# Patient Record
Sex: FEMALE | NOT HISPANIC OR LATINO | Employment: FULL TIME | URBAN - NONMETROPOLITAN AREA
[De-identification: names, ages, dates, MRNs, and addresses within clinical notes are randomized per-mention and may not be internally consistent; named-entity substitution may affect disease eponyms.]

---

## 2022-02-28 ENCOUNTER — TRANSCRIBE ORDERS (OUTPATIENT)
Dept: PHYSICAL THERAPY | Facility: CLINIC | Age: 52
End: 2022-02-28

## 2022-02-28 DIAGNOSIS — Z98.890 S/P LUMBAR DISCECTOMY: Primary | ICD-10-CM

## 2022-02-28 DIAGNOSIS — M51.36 BULGE OF LUMBAR DISC WITHOUT MYELOPATHY: ICD-10-CM

## 2022-03-03 ENCOUNTER — TREATMENT (OUTPATIENT)
Dept: PHYSICAL THERAPY | Facility: CLINIC | Age: 52
End: 2022-03-03

## 2022-03-03 DIAGNOSIS — Z98.890 S/P LUMBAR DISCECTOMY: Primary | ICD-10-CM

## 2022-03-03 DIAGNOSIS — M25.69 DECREASED ROM OF TRUNK AND BACK: ICD-10-CM

## 2022-03-03 DIAGNOSIS — M51.36 BULGE OF LUMBAR DISC WITHOUT MYELOPATHY: ICD-10-CM

## 2022-03-03 DIAGNOSIS — M62.81 WEAKNESS OF TRUNK MUSCULATURE: ICD-10-CM

## 2022-03-03 PROCEDURE — 97110 THERAPEUTIC EXERCISES: CPT | Performed by: PHYSICAL THERAPIST

## 2022-03-03 PROCEDURE — 97162 PT EVAL MOD COMPLEX 30 MIN: CPT | Performed by: PHYSICAL THERAPIST

## 2022-03-03 NOTE — PROGRESS NOTES
Physical Therapy Initial Evaluation and Plan of Care      Patient: Alejandrina Vieyra   : 1970  Diagnosis/ICD-10 Code:  S/P lumbar discectomy [Z98.890]  Referring practitioner: MELLISSA Cason  Date of Initial Visit: 3/3/2022  Today's Date: 3/4/2022  Patient seen for 1 sessions         Visit Diagnoses:    ICD-10-CM ICD-9-CM   1. S/P lumbar discectomy  Z98.890 V45.89   2. Weakness of trunk musculature  M62.81 728.87   3. Decreased ROM of trunk and back  M25.69 719.59   4. Bulge of lumbar disc without myelopathy  M51.26 722.10         Subjective Evaluation    History of Present Illness  Mechanism of injury: Pt presents to [facility] PHYSICAL THERAPY to be evaluated for S/P lumbar discectomy, 10/11/21.    Pt relates pain in R LB, radiculopathy in R LE, had discectomy 10/11/21. Relates R sided back pain decreased and has residual tingling in R distal post lateral knee/ prox calf.     States had a L heel spur and plantar fascitis, pain to load heel, altered gait pattern, had an endoscopic fascial release, 21.    Relates in 2022, began to feel L LB/ superior hip/ buttock pain. Denies radiculopathy to L LE. Reports trouble sleeping due to back pain repositioning.     Denies current stretching or strengthening routine.     Pt works as nurse 2x/week.     States has been muscle guarding trunk and motion, for several months.     Pt relates is not interested in another surgery, wants to regain flexibility, strength , stability, and resume exercise program. States feels stiff.    Pt also wants to learn proper body mechanics and ergonomics to practice safer movement patterns.               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  Relieving factors: ice, heat and medications (ibuprofen 800, 3x day,  "flexoril)  Aggravating factors: prolonged positioning (bending, standing , sitting prolonged periods, transfer pts )    Social Support  Lives with: family.    Hand dominance: right    Patient Goals  Patient goals for therapy: decreased pain, increased motion, return to sport/leisure activities and increased strength  Patient goal: ergonomics, \"learn how to not hurt myself\"           Objective          Ambulation     Comments   Normalized gait pattern  Transfer sit>stand slightly guarded       General Comments     Lumbar Comments  Well healed incision lower back aprox 1.5 cm    Mild TTP over L SI, lower lumbar paraspinals, L superior hip/ glute medius    Trunk AROM:  Flexion 30 with knees extended, c/o tightness in paraspinals, HS   Flexion 40 with knees flexed, same c/o as above  Ext 15 with c/o discomfort in back  B SB, B rotation WFL, no c/o     MMT:   B HS 4/5, other wise no focal weakness in B LEs noted    SEATED:   Trunk flexion limited 85% due to c/o tightness and pain   Trunk extension WFL, but c/o at end range           Assessment & Plan     Assessment  Impairments: abnormal or restricted ROM, activity intolerance, impaired physical strength, lacks appropriate home exercise program and pain with function  Other impairment: decreased core / trunk strength / stabilization, tightness throughout trunk/ B LE  Functional Limitations: lifting, sleeping, walking, pulling, pushing, uncomfortable because of pain, moving in bed, sitting, standing and stooping  Assessment details: Pt presents with decreased functional ability due to c/o L LBP/ trunk tightness.      Pt demonstrates/ relates   -limited painful trunk AROM,   -altered and transfers due to pain/tightness,   -interference with sleeping due to pain,   -poor trunk/core stabilization,   -limited ability to perform ADLs with functional trunk ROM,   -interference  with community/work/recreational activities due to trunk instability/ tightness, "   -deconditioning,  -lack of daily aerobic exercise,   -lack of  progressive HEP .    Pt would benefit from skilled physical  therapy intervention to address the above mentioned deficits.     Time was spent discussing therapy program. Resuming exercise program parameters. Pt's questions were addressed as they arose.      Barriers to therapy: none noted at this timdespite being released from surgical proceedure, progress lifting and end range stretching  as tolerated  Prognosis: good    Goals  Plan Goals: Short Term Goals (6 weeks):      Pt is independent with HEP ea visit.    Pt is able to sleep without being disrupted by pain.    Pt has functional trunk AROM with min/ no c/o pain or stiffness.    Pt has greater than 50% improvement overall.      Pt has minimal to no tenderness to palpation over L LB.       Pt will report ability to decrease pain, using modalities, stretching, change of position, >50% of the time.     Pt will be I in energy conservation techniques.        Long Term Goals (12 weeks)    Pt is able to return to work/community activities with minimal to no discomfort.    Pt is able to lift  from floor to waist , with proper body mechanics..    Pt is able to transfer loads, laterally and forward/ back, with proper body mechanics.    Pt will report resuming daily aerobic conditioning program with min/no, limitation.    Pt will demonstrate proper body mechanics while performing house hold chores; sweeping, mopping, vacuuming, lifting.     Pt will be I in final HEP to perform after formal physical therapy has been discontinued.      Plan  Therapy options: will be seen for skilled therapy services  Planned modality interventions: dry needling, thermotherapy (hydrocollator packs) and cryotherapy  Other planned modality interventions: any modalities as indicated to benefit the pt  Planned therapy interventions: abdominal trunk stabilization, body mechanics training, flexibility, functional ROM exercises, home  exercise program, manual therapy, neuromuscular re-education, postural training, soft tissue mobilization, strengthening, stretching, therapeutic activities and transfer training  Other planned therapy interventions: any other intervention deemed necessary to benefit the pt  Frequency: 2x week  Duration in weeks: 12  Treatment plan discussed with: patient and PTA  Plan details: Will continue therapy involving education, stretching, strengthening, stabilization training, modalities as indicated, manual therapy techniques as indicated, progressive HEP instruction.    Practice proper body mechanics with lateral loads, lifting, home chores.     Next visit, reassess tolerance to current exercises and modify and/or progress as indicated.         Planned interventions:  Any other modality and/or intervention deemed necessary to benefit the patient.        History # of Personal Factors and/or Comorbidities: MODERATE (1-2)  Examination of Body System(s): # of elements: HIGH (4+)  Clinical Presentation: STABLE   Clinical Decision Making: MODERATE      Timed:  Manual Therapy:         mins  68697;  Therapeutic Exercise:    15     mins  52338;     Neuromuscular Carmelo:        mins  74976;    Therapeutic Activity:          mins  81103;     Gait Training:           mins  77980;     Ultrasound:          mins  84099;    Canalith Repos           ___  mins  57502      Untimed:  Electrical Stimulation:         mins  30155 ( );  Mechanical Traction:         mins  48311;     Low Complexity Evaluation:                           mins  93365;  Moderate Complexity Evaluation:             25     mins  47201;   High Complexity Evaluation:                          mins  65845       Timed Treatment:  40    mins   Total Treatment:     45   mins      DATE TREATMENT INITIATED: 3/4/2022              PT SIGNATURE: Bethany Hu PT   [place] License: [id number]  This document has been electronically signed by Bethany Hu PT on March 4, 2022 17:14  EST        Initial Certification    Certification Period: 3/4/2022 thru 6/1/2022  I certify that the therapy services are furnished while this patient is under my care.  The services outlined above are required by this patient, and will be reviewed every 90 days.     PHYSICIAN: Loren Milton PA   NPI: [id number]      PHYSICIAN PRINT NAME: ______________________________________________      PHYSICIAN SIGNATURE: ______________________________________________         DATE:________________________________

## 2022-03-07 ENCOUNTER — TREATMENT (OUTPATIENT)
Dept: PHYSICAL THERAPY | Facility: CLINIC | Age: 52
End: 2022-03-07

## 2022-03-07 DIAGNOSIS — M25.69 DECREASED ROM OF TRUNK AND BACK: ICD-10-CM

## 2022-03-07 DIAGNOSIS — Z98.890 S/P LUMBAR DISCECTOMY: Primary | ICD-10-CM

## 2022-03-07 DIAGNOSIS — M62.81 WEAKNESS OF TRUNK MUSCULATURE: ICD-10-CM

## 2022-03-07 DIAGNOSIS — M51.36 BULGE OF LUMBAR DISC WITHOUT MYELOPATHY: ICD-10-CM

## 2022-03-07 PROCEDURE — 97530 THERAPEUTIC ACTIVITIES: CPT | Performed by: PHYSICAL THERAPIST

## 2022-03-07 PROCEDURE — 97116 GAIT TRAINING THERAPY: CPT | Performed by: PHYSICAL THERAPIST

## 2022-03-07 PROCEDURE — 97112 NEUROMUSCULAR REEDUCATION: CPT | Performed by: PHYSICAL THERAPIST

## 2022-03-07 PROCEDURE — 97110 THERAPEUTIC EXERCISES: CPT | Performed by: PHYSICAL THERAPIST

## 2022-03-07 NOTE — PROGRESS NOTES
Physical Therapy Treatment Note      Patient: Alejandrina Vieyra   : 1970  Referring practitioner: MELLISSA Cason  Date of Initial Visit: Type: THERAPY  Noted: 3/3/2022  Today's Date: 3/7/2022  Patient seen for 2 sessions           Visit Diagnoses:    ICD-10-CM ICD-9-CM   1. S/P lumbar discectomy  Z98.890 V45.89   2. Weakness of trunk musculature  M62.81 728.87   3. Decreased ROM of trunk and back  M25.69 719.59   4. Bulge of lumbar disc without myelopathy  M51.26 722.10       Subjective Evaluation    History of Present Illness  Mechanism of injury: States feels sore in L lateral hip. Decreased LB tightness.   HEP going well, but have questions.            Objective   See Exercise, Manual, and Modality Logs for complete treatment.       Assessment & Plan     Assessment    Assessment details: Good effort today.   Biomechanics practiced, improved positioning and WS with practice. After each task, pt related understanding of concept and demonstrated good form.     Productive session today. Pt related mild fatigue after session.     Pt was given WHEP of today's exercises.  Pt related understanding of precautions, progression parameters. Questions were addressed as they arose.         Goals  Plan Goals: Short Term Goals (6 weeks):      Pt is independent with HEP ea visit. ONGOING    Pt is able to sleep without being disrupted by pain.    Pt has functional trunk AROM with min/ no c/o pain or stiffness. PROGRESSING    Pt has greater than 50% improvement overall.  PROGRESSING    Pt has minimal to no tenderness to palpation over L LB.   PROGRESSING    Pt will report ability to decrease pain, using modalities, stretching, change of position, >50% of the time. PROGRESSING    Pt will be I in energy conservation techniques. PROGRESSING        Long Term Goals (12 weeks)    Pt is able to return to work/community activities with minimal to no discomfort.    Pt is able to lift  from floor to waist , with proper body  mechanics..    Pt is able to transfer loads, laterally and forward/ back, with proper body mechanics. PROGRESSING    Pt will report resuming daily aerobic conditioning program with min/no, limitation. PROGRESSING    Pt will demonstrate proper body mechanics while performing house hold chores; sweeping, mopping, vacuuming, lifting.  PROGRESSING    Pt will be I in final HEP to perform after formal physical therapy has been discontinued.    Plan  Plan details: Continue therapy involving education, stretching, strengthening, stabilization, practice proper body mechanics with lateral loads, lifting, home chores.     Progress HEP.     Next visit, reassess tolerance to current exercises and modify and/or progress as indicated.                  Timed:  Manual Therapy:         mins  10061;  Therapeutic Exercise:    10     mins  04978;     Therapeutic Activity:     15     mins  98854;    Neuromuscular Carmelo:    15    mins  81230;    Gait Training:      15     mins  35520;     Ultrasound:          mins  53794;    Mechanical Traction         mins   64550     Un-timed:   Electrical Stimulation         mins  39269 ()  Traction          mins  72992    Timed Treatment:   55   mins   Total Treatment:     60   mins    OZIEL Watson PT    Physical Therapist  [place] License [id number]    This document has been electronically signed by Bethany Hu PT on March 7, 2022 13:08 EST

## 2022-03-09 ENCOUNTER — TREATMENT (OUTPATIENT)
Dept: PHYSICAL THERAPY | Facility: CLINIC | Age: 52
End: 2022-03-09

## 2022-03-09 DIAGNOSIS — M25.69 DECREASED ROM OF TRUNK AND BACK: ICD-10-CM

## 2022-03-09 DIAGNOSIS — Z98.890 S/P LUMBAR DISCECTOMY: Primary | ICD-10-CM

## 2022-03-09 DIAGNOSIS — M62.81 WEAKNESS OF TRUNK MUSCULATURE: ICD-10-CM

## 2022-03-09 DIAGNOSIS — M51.36 BULGE OF LUMBAR DISC WITHOUT MYELOPATHY: ICD-10-CM

## 2022-03-09 PROCEDURE — 97110 THERAPEUTIC EXERCISES: CPT | Performed by: PHYSICAL THERAPIST

## 2022-03-09 PROCEDURE — 97140 MANUAL THERAPY 1/> REGIONS: CPT | Performed by: PHYSICAL THERAPIST

## 2022-03-09 NOTE — PROGRESS NOTES
Physical Therapy Daily Treatment Note      Patient: Alejandrina Vieyra   : 1970  Referring practitioner: MELLISSA Cason  Date of Initial Visit: Type: THERAPY  Noted: 3/3/2022  Today's Date: 3/9/2022  Patient seen for 3 sessions           Subjective Evaluation    History of Present Illness    Subjective comment: 7/10 very tender today stating pain increased over the weekend.       Objective   See Exercise, Manual, and Modality Logs for complete treatment.       Assessment & Plan     Assessment    Assessment details: Pt was very tender and moving gently at tx time. Pt feels there is a pull that goes from the front (pointing to the groin) to the back (pointing to the PSIS). Pt required coaching of vc with tactile cues for correct performance of PPT.  Pt amb out of clinic with less guarding then the amb into the clinic. Pt has a L Pelvic torsion that looks to be about 1 1/2 thumb widths off.        Goals  Plan Goals: Short Term Goals (6 weeks):      Pt is independent with HEP ea visit. ONGOING    Pt is able to sleep without being disrupted by pain.    Pt has functional trunk AROM with min/ no c/o pain or stiffness. PROGRESSING    Pt has greater than 50% improvement overall.  PROGRESSING    Pt has minimal to no tenderness to palpation over L LB.   PROGRESSING    Pt will report ability to decrease pain, using modalities, stretching, change of position, >50% of the time. PROGRESSING    Pt will be I in energy conservation techniques. PROGRESSING        Long Term Goals (12 weeks)    Pt is able to return to work/community activities with minimal to no discomfort.    Pt is able to lift  from floor to waist , with proper body mechanics..    Pt is able to transfer loads, laterally and forward/ back, with proper body mechanics. PROGRESSING    Pt will report resuming daily aerobic conditioning program with min/no, limitation. PROGRESSING    Pt will demonstrate proper body mechanics while performing house hold chores; sweeping,  mopping, vacuuming, lifting.  PROGRESSING    Pt will be I in final HEP to perform after formal physical therapy has been discontinued.    Plan  Plan details: Cont with regaining pelvic alignment and promoting core stabilization for decreased pressure on the lumbar spine. Cont with stretches and strengthening as tolerated.            Visit Diagnoses:    ICD-10-CM ICD-9-CM   1. S/P lumbar discectomy  Z98.890 V45.89   2. Weakness of trunk musculature  M62.81 728.87   3. Decreased ROM of trunk and back  M25.69 719.59   4. Bulge of lumbar disc without myelopathy  M51.26 722.10       Progress per Plan of Care    Timed:    Therapeutic Exercise:    16     mins  65880;  Manual Therapy:    24     mins  45341;     Neuromuscular Carmelo:       mins  86563;    Therapeutic Activity:          mins  70308;     Gait Training:           mins  72856;     Ultrasound:          mins  48447;      Untimed:  Electrical Stimulation:         mins  95666 ( );  Mechanical Traction:         mins  16851;     Timed Treatment:   40   mins   Total Treatment:     42   mins      Vale Gore PTA  Physical Therapist Assistant

## 2022-03-15 ENCOUNTER — TREATMENT (OUTPATIENT)
Dept: PHYSICAL THERAPY | Facility: CLINIC | Age: 52
End: 2022-03-15

## 2022-03-15 DIAGNOSIS — M51.36 BULGE OF LUMBAR DISC WITHOUT MYELOPATHY: ICD-10-CM

## 2022-03-15 DIAGNOSIS — Z98.890 S/P LUMBAR DISCECTOMY: Primary | ICD-10-CM

## 2022-03-15 DIAGNOSIS — M62.81 WEAKNESS OF TRUNK MUSCULATURE: ICD-10-CM

## 2022-03-15 DIAGNOSIS — M25.69 DECREASED ROM OF TRUNK AND BACK: ICD-10-CM

## 2022-03-15 PROCEDURE — 97530 THERAPEUTIC ACTIVITIES: CPT | Performed by: PHYSICAL THERAPIST

## 2022-03-15 PROCEDURE — 97112 NEUROMUSCULAR REEDUCATION: CPT | Performed by: PHYSICAL THERAPIST

## 2022-03-15 NOTE — PROGRESS NOTES
Physical Therapy Treatment Note      Patient: Alejandrina Vieyra   : 1970  Referring practitioner: MELLISSA Cason  Date of Initial Visit: Type: THERAPY  Noted: 3/3/2022  Today's Date: 3/15/2022  Patient seen for 4 sessions           Visit Diagnoses:    ICD-10-CM ICD-9-CM   1. S/P lumbar discectomy  Z98.890 V45.89   2. Weakness of trunk musculature  M62.81 728.87   3. Decreased ROM of trunk and back  M25.69 719.59   4. Bulge of lumbar disc without myelopathy  M51.26 722.10       Subjective Evaluation    History of Present Illness  Mechanism of injury: States has been having significant pain in L SI, hip, radiating to LE.  Relates difficulty to transfer, walk.                Objective          Ambulation     Comments   Presents with a limp, decreased WB L LE   Guarded transfer sit<>stand<>sit<>supine  TTP over L SI, lateral sacrum    Following session , pt transferred sit>stand with less pain, normalized gait pattern      See Exercise, Manual, and Modality Logs for complete treatment.       Assessment & Plan     Assessment    Assessment details: Improved transfer and gait after session. Normalized gait pattern,  symmetrical WB.   Decreased c/o pain .     Pt was given WHEP of today's exercises.  Pt related understanding of precautions, progression parameters. Questions were addressed as they arose.         Goals  Plan Goals: Short Term Goals (6 weeks):      Pt is independent with HEP ea visit. ONGOING    Pt is able to sleep without being disrupted by pain.    Pt has functional trunk AROM with min/ no c/o pain or stiffness. PROGRESSING    Pt has greater than 50% improvement overall.  PROGRESSING    Pt has minimal to no tenderness to palpation over L LB.   PROGRESSING    Pt will report ability to decrease pain, using modalities, stretching, change of position, >50% of the time. PROGRESSING    Pt will be I in energy conservation techniques. PROGRESSING        Long Term Goals (12 weeks)    Pt is able to return to  work/community activities with minimal to no discomfort.    Pt is able to lift  from floor to waist , with proper body mechanics..    Pt is able to transfer loads, laterally and forward/ back, with proper body mechanics. PROGRESSING    Pt will report resuming daily aerobic conditioning program with min/no, limitation. PROGRESSING    Pt will demonstrate proper body mechanics while performing house hold chores; sweeping, mopping, vacuuming, lifting.  PROGRESSING    Pt will be I in final HEP to perform after formal physical therapy has been discontinued.    Plan  Plan details: Continue therapy as planned: reassess pelvic alignment/ pain level, continue with pelvic/ trunk stabilization, progress stretching as indicated, progress HEP.     Next visit, reassess tolerance to current exercises and modify and/or progress as indicated.                  Timed:  Manual Therapy:         mins  31421;  Therapeutic Exercise:         mins  85314;     Therapeutic Activity:     15     mins  21353;    Neuromuscular Carmelo:    15    mins  17705;    Gait Training:           mins  23273;     Ultrasound:          mins  56225;    Mechanical Traction         mins   84457     Un-timed:   Electrical Stimulation         mins  08229 ()  Traction          mins  29420    Timed Treatment:   30   mins   Total Treatment:     35   mins    Bethany Hu, PT            Bethany Hu, PT    Physical Therapist  [place] License [id number]    This document has been electronically signed by Bethany Hu PT on March 15, 2022 11:38 EDT

## 2022-03-17 ENCOUNTER — TREATMENT (OUTPATIENT)
Dept: PHYSICAL THERAPY | Facility: CLINIC | Age: 52
End: 2022-03-17

## 2022-03-17 DIAGNOSIS — M51.36 BULGE OF LUMBAR DISC WITHOUT MYELOPATHY: ICD-10-CM

## 2022-03-17 DIAGNOSIS — M25.69 DECREASED ROM OF TRUNK AND BACK: ICD-10-CM

## 2022-03-17 DIAGNOSIS — Z98.890 S/P LUMBAR DISCECTOMY: Primary | ICD-10-CM

## 2022-03-17 DIAGNOSIS — M62.81 WEAKNESS OF TRUNK MUSCULATURE: ICD-10-CM

## 2022-03-17 PROCEDURE — 97140 MANUAL THERAPY 1/> REGIONS: CPT | Performed by: PHYSICAL THERAPIST

## 2022-03-17 PROCEDURE — 97110 THERAPEUTIC EXERCISES: CPT | Performed by: PHYSICAL THERAPIST

## 2022-03-17 NOTE — PROGRESS NOTES
Physical Therapy Daily Treatment Note      Patient: Alejandrina Vieyra   : 1970  Referring practitioner: MELLISSA Cason  Date of Initial Visit: Type: THERAPY  Noted: 3/3/2022  Today's Date: 3/17/2022  Patient seen for 5 sessions           Subjective Evaluation    History of Present Illness    Subjective comment: 4/10       Objective   See Exercise, Manual, and Modality Logs for complete treatment.       Assessment & Plan     Assessment    Assessment details: Pt cont to have a R pelvic torsion with tenderness in the pirifrmis, glutes and hip flexors. Pt is able to perform. PPT now without cuing. Pt has some tightness along the paraspinals of the low back in the area of L4-L5.     Goals  Plan Goals: Short Term Goals (6 weeks):      Pt is independent with HEP ea visit. ONGOING    Pt is able to sleep without being disrupted by pain.    Pt has functional trunk AROM with min/ no c/o pain or stiffness. PROGRESSING    Pt has greater than 50% improvement overall.  PROGRESSING    Pt has minimal to no tenderness to palpation over L LB.   PROGRESSING    Pt will report ability to decrease pain, using modalities, stretching, change of position, >50% of the time. PROGRESSING    Pt will be I in energy conservation techniques. PROGRESSING        Long Term Goals (12 weeks)    Pt is able to return to work/community activities with minimal to no discomfort.    Pt is able to lift  from floor to waist , with proper body mechanics..    Pt is able to transfer loads, laterally and forward/ back, with proper body mechanics. PROGRESSING    Pt will report resuming daily aerobic conditioning program with min/no, limitation. PROGRESSING    Pt will demonstrate proper body mechanics while performing house hold chores; sweeping, mopping, vacuuming, lifting.  PROGRESSING    Pt will be I in final HEP to perform after formal physical therapy has been discontinued.    Plan  Plan details: Cont with Pelvic stabilization and strengthening activities  as well and working toward a more neutral alignment.           Visit Diagnoses:    ICD-10-CM ICD-9-CM   1. S/P lumbar discectomy  Z98.890 V45.89   2. Weakness of trunk musculature  M62.81 728.87   3. Decreased ROM of trunk and back  M25.69 719.59   4. Bulge of lumbar disc without myelopathy  M51.26 722.10       Progress per Plan of Care    Timed:    Therapeutic Exercise:    12     mins  35657;  Manual Therapy:    15     mins  40513;     Neuromuscular Carmelo:   4    mins  98828;    Therapeutic Activity:          mins  04896;     Gait Training:           mins  00740;     Ultrasound:          mins  42567;      Untimed:  Electrical Stimulation:         mins  82075 ( );  Mechanical Traction:         mins  33350;     Timed Treatment:   31   mins   Total Treatment:     36   mins      Vale Gore PTA  Physical Therapist Assistant

## 2022-04-08 ENCOUNTER — DOCUMENTATION (OUTPATIENT)
Dept: PHYSICAL THERAPY | Facility: CLINIC | Age: 52
End: 2022-04-08

## 2022-04-08 DIAGNOSIS — Z98.890 S/P LUMBAR DISCECTOMY: Primary | ICD-10-CM

## 2022-04-08 DIAGNOSIS — M51.36 BULGE OF LUMBAR DISC WITHOUT MYELOPATHY: ICD-10-CM

## 2022-04-08 DIAGNOSIS — M62.81 WEAKNESS OF TRUNK MUSCULATURE: ICD-10-CM

## 2022-04-08 DIAGNOSIS — M25.69 DECREASED ROM OF TRUNK AND BACK: ICD-10-CM

## 2022-04-08 NOTE — PROGRESS NOTES
Discharge Summary from Physical Therapy        Patient Information  Alejandrina Vieyra  1970  Diagnosis/ICD - 10 Code:  S/P lumbar discectomy [Z98.890]  Referring practitioner: No ref. provider found  Date of initial visit: 4/8/2022  Today's date: 4/8/2022  Patient was seen for Visit count could not be calculated. Make sure you are using a visit which is associated with an episode. sessions      Visit Diagnoses:    ICD-10-CM ICD-9-CM   1. S/P lumbar discectomy  Z98.890 V45.89   2. Weakness of trunk musculature  M62.81 728.87   3. Decreased ROM of trunk and back  M25.69 719.59   4. Bulge of lumbar disc without myelopathy  M51.26 722.10         Discharge Status of Patient: See PT Note dated 3/17/22    Goals: Not Met    Discharge Plan: Pt plans to have surgery                      PT SIGNATURE: Bethany Hu PT MS,PT  [place] License: [id number]    This document has been electronically signed by Bethany Hu PT on April 8, 2022 08:58 EDT